# Patient Record
Sex: FEMALE | Race: WHITE | NOT HISPANIC OR LATINO | Employment: OTHER | URBAN - METROPOLITAN AREA
[De-identification: names, ages, dates, MRNs, and addresses within clinical notes are randomized per-mention and may not be internally consistent; named-entity substitution may affect disease eponyms.]

---

## 2023-10-13 ENCOUNTER — HOSPITAL ENCOUNTER (EMERGENCY)
Facility: HOSPITAL | Age: 63
Discharge: HOME | End: 2023-10-13
Attending: EMERGENCY MEDICINE
Payer: MEDICARE

## 2023-10-13 ENCOUNTER — APPOINTMENT (OUTPATIENT)
Dept: GENERAL RADIOLOGY | Facility: HOSPITAL | Age: 63
End: 2023-10-13
Payer: MEDICARE

## 2023-10-13 VITALS
TEMPERATURE: 100.3 F | WEIGHT: 127 LBS | RESPIRATION RATE: 16 BRPM | DIASTOLIC BLOOD PRESSURE: 57 MMHG | SYSTOLIC BLOOD PRESSURE: 118 MMHG | OXYGEN SATURATION: 97 % | HEART RATE: 86 BPM | HEIGHT: 66 IN | BODY MASS INDEX: 20.41 KG/M2

## 2023-10-13 DIAGNOSIS — R06.00 ACUTE DYSPNEA: ICD-10-CM

## 2023-10-13 DIAGNOSIS — J18.9 PNEUMONIA OF BOTH LUNGS DUE TO INFECTIOUS ORGANISM, UNSPECIFIED PART OF LUNG: Primary | ICD-10-CM

## 2023-10-13 LAB
ALBUMIN SERPL-MCNC: 4.2 G/DL (ref 3.5–5.2)
ALBUMIN/GLOB SERPL: 1.9 G/DL
ALP SERPL-CCNC: 67 U/L (ref 39–117)
ALT SERPL W P-5'-P-CCNC: 11 U/L (ref 1–33)
ANION GAP SERPL CALCULATED.3IONS-SCNC: 10.4 MMOL/L (ref 5–15)
AST SERPL-CCNC: 19 U/L (ref 1–32)
B PARAPERT DNA SPEC QL NAA+PROBE: NOT DETECTED
B PERT DNA SPEC QL NAA+PROBE: NOT DETECTED
BASOPHILS # BLD AUTO: 0.04 10*3/MM3 (ref 0–0.2)
BASOPHILS NFR BLD AUTO: 0.4 % (ref 0–1.5)
BILIRUB SERPL-MCNC: 0.2 MG/DL (ref 0–1.2)
BUN SERPL-MCNC: 11 MG/DL (ref 8–23)
BUN/CREAT SERPL: 15.3 (ref 7–25)
C PNEUM DNA NPH QL NAA+NON-PROBE: NOT DETECTED
CALCIUM SPEC-SCNC: 8.9 MG/DL (ref 8.6–10.5)
CHLORIDE SERPL-SCNC: 100 MMOL/L (ref 98–107)
CO2 SERPL-SCNC: 24.6 MMOL/L (ref 22–29)
CREAT SERPL-MCNC: 0.72 MG/DL (ref 0.57–1)
DEPRECATED RDW RBC AUTO: 40.8 FL (ref 37–54)
EGFRCR SERPLBLD CKD-EPI 2021: 92.9 ML/MIN/1.73
EOSINOPHIL # BLD AUTO: 0.07 10*3/MM3 (ref 0–0.4)
EOSINOPHIL NFR BLD AUTO: 0.7 % (ref 0.3–6.2)
ERYTHROCYTE [DISTWIDTH] IN BLOOD BY AUTOMATED COUNT: 12.6 % (ref 12.3–15.4)
FLUAV SUBTYP SPEC NAA+PROBE: NOT DETECTED
FLUBV RNA ISLT QL NAA+PROBE: NOT DETECTED
GLOBULIN UR ELPH-MCNC: 2.2 GM/DL
GLUCOSE SERPL-MCNC: 205 MG/DL (ref 65–99)
HADV DNA SPEC NAA+PROBE: NOT DETECTED
HCOV 229E RNA SPEC QL NAA+PROBE: NOT DETECTED
HCOV HKU1 RNA SPEC QL NAA+PROBE: NOT DETECTED
HCOV NL63 RNA SPEC QL NAA+PROBE: NOT DETECTED
HCOV OC43 RNA SPEC QL NAA+PROBE: NOT DETECTED
HCT VFR BLD AUTO: 34.7 % (ref 34–46.6)
HGB BLD-MCNC: 11.8 G/DL (ref 12–15.9)
HMPV RNA NPH QL NAA+NON-PROBE: NOT DETECTED
HOLD SPECIMEN: NORMAL
HOLD SPECIMEN: NORMAL
HPIV1 RNA ISLT QL NAA+PROBE: NOT DETECTED
HPIV2 RNA SPEC QL NAA+PROBE: NOT DETECTED
HPIV3 RNA NPH QL NAA+PROBE: NOT DETECTED
HPIV4 P GENE NPH QL NAA+PROBE: NOT DETECTED
IMM GRANULOCYTES # BLD AUTO: 0.02 10*3/MM3 (ref 0–0.05)
IMM GRANULOCYTES NFR BLD AUTO: 0.2 % (ref 0–0.5)
LYMPHOCYTES # BLD AUTO: 0.64 10*3/MM3 (ref 0.7–3.1)
LYMPHOCYTES NFR BLD AUTO: 6 % (ref 19.6–45.3)
M PNEUMO IGG SER IA-ACNC: NOT DETECTED
MCH RBC QN AUTO: 30.8 PG (ref 26.6–33)
MCHC RBC AUTO-ENTMCNC: 34 G/DL (ref 31.5–35.7)
MCV RBC AUTO: 90.6 FL (ref 79–97)
MONOCYTES # BLD AUTO: 0.8 10*3/MM3 (ref 0.1–0.9)
MONOCYTES NFR BLD AUTO: 7.5 % (ref 5–12)
NEUTROPHILS NFR BLD AUTO: 85.2 % (ref 42.7–76)
NEUTROPHILS NFR BLD AUTO: 9.1 10*3/MM3 (ref 1.7–7)
NRBC BLD AUTO-RTO: 0 /100 WBC (ref 0–0.2)
NT-PROBNP SERPL-MCNC: 400 PG/ML (ref 0–900)
PLATELET # BLD AUTO: 200 10*3/MM3 (ref 140–450)
PMV BLD AUTO: 10.4 FL (ref 6–12)
POTASSIUM SERPL-SCNC: 3.8 MMOL/L (ref 3.5–5.2)
PROCALCITONIN SERPL-MCNC: <0.02 NG/ML (ref 0–0.25)
PROT SERPL-MCNC: 6.4 G/DL (ref 6–8.5)
RBC # BLD AUTO: 3.83 10*6/MM3 (ref 3.77–5.28)
RHINOVIRUS RNA SPEC NAA+PROBE: NOT DETECTED
RSV RNA NPH QL NAA+NON-PROBE: NOT DETECTED
SARS-COV-2 RNA NPH QL NAA+NON-PROBE: NOT DETECTED
SODIUM SERPL-SCNC: 135 MMOL/L (ref 136–145)
TROPONIN T SERPL HS-MCNC: 9 NG/L
WBC NRBC COR # BLD: 10.67 10*3/MM3 (ref 3.4–10.8)
WHOLE BLOOD HOLD COAG: NORMAL
WHOLE BLOOD HOLD SPECIMEN: NORMAL

## 2023-10-13 PROCEDURE — 83880 ASSAY OF NATRIURETIC PEPTIDE: CPT

## 2023-10-13 PROCEDURE — 71045 X-RAY EXAM CHEST 1 VIEW: CPT

## 2023-10-13 PROCEDURE — 93005 ELECTROCARDIOGRAM TRACING: CPT | Performed by: EMERGENCY MEDICINE

## 2023-10-13 PROCEDURE — 0202U NFCT DS 22 TRGT SARS-COV-2: CPT | Performed by: EMERGENCY MEDICINE

## 2023-10-13 PROCEDURE — 84145 PROCALCITONIN (PCT): CPT | Performed by: EMERGENCY MEDICINE

## 2023-10-13 PROCEDURE — 94664 DEMO&/EVAL PT USE INHALER: CPT

## 2023-10-13 PROCEDURE — 94640 AIRWAY INHALATION TREATMENT: CPT

## 2023-10-13 PROCEDURE — 94761 N-INVAS EAR/PLS OXIMETRY MLT: CPT

## 2023-10-13 PROCEDURE — 99284 EMERGENCY DEPT VISIT MOD MDM: CPT

## 2023-10-13 PROCEDURE — 94799 UNLISTED PULMONARY SVC/PX: CPT

## 2023-10-13 PROCEDURE — 85025 COMPLETE CBC W/AUTO DIFF WBC: CPT | Performed by: EMERGENCY MEDICINE

## 2023-10-13 PROCEDURE — 80053 COMPREHEN METABOLIC PANEL: CPT

## 2023-10-13 PROCEDURE — 84484 ASSAY OF TROPONIN QUANT: CPT

## 2023-10-13 RX ORDER — CEFDINIR 300 MG/1
300 CAPSULE ORAL 2 TIMES DAILY
Qty: 13 CAPSULE | Refills: 0 | Status: SHIPPED | OUTPATIENT
Start: 2023-10-13

## 2023-10-13 RX ORDER — IPRATROPIUM BROMIDE AND ALBUTEROL SULFATE 2.5; .5 MG/3ML; MG/3ML
3 SOLUTION RESPIRATORY (INHALATION) ONCE
Status: COMPLETED | OUTPATIENT
Start: 2023-10-13 | End: 2023-10-13

## 2023-10-13 RX ORDER — CEFDINIR 300 MG/1
300 CAPSULE ORAL ONCE
Status: COMPLETED | OUTPATIENT
Start: 2023-10-13 | End: 2023-10-13

## 2023-10-13 RX ORDER — SODIUM CHLORIDE 0.9 % (FLUSH) 0.9 %
10 SYRINGE (ML) INJECTION AS NEEDED
Status: DISCONTINUED | OUTPATIENT
Start: 2023-10-13 | End: 2023-10-13 | Stop reason: HOSPADM

## 2023-10-13 RX ADMIN — CEFDINIR 300 MG: 300 CAPSULE ORAL at 18:14

## 2023-10-13 RX ADMIN — IPRATROPIUM BROMIDE AND ALBUTEROL SULFATE 3 ML: .5; 2.5 SOLUTION RESPIRATORY (INHALATION) at 16:43

## 2023-10-13 NOTE — DISCHARGE INSTRUCTIONS
Take antibiotics as prescribed.  Continue take your Zithromax, inhaler, and cough medication.  Return to the emergency department for worsening/persistent symptoms, chest pain, fever, or other concern.  Follow-up with your primary care provider if symptoms are not improving in the next several days.

## 2023-10-13 NOTE — ED NOTES
Patient ambulated in hallway, HR remained in 90s, O2 sats remained >94%. Patient reports improvement of cough after breathing treatment. MD Dennison aware.

## 2023-10-13 NOTE — ED NOTES
Patient to ER via car from [facility] for cough and URI x 3 weeks    Patient reports she was treated 3 weeks ago and is getting worse    Patient had xray and neb treatment at [facility]

## 2023-10-13 NOTE — ED PROVIDER NOTES
EMERGENCY DEPARTMENT ENCOUNTER    Room Number:  10/10  PCP: Provider, No Known  Historian: Patient, friend      HPI:  Chief Complaint: Cough, shortness of breath  A complete HPI/ROS/PMH/PSH/SH/FH are unobtainable due to: Nothing  Context: Jonelle Stearns is a 63 y.o. female who presents to the ED from the [facility] by EMS c/o cough and shortness of breath for the past 3 weeks.  She was initially seen at the [facility] about 3 weeks ago and given a prescription for doxycycline.  Symptoms gradually improved but then worsened about 2 days ago.  Cough is productive of a scant amount of thick yellow sputum.  She reports feeling short of breath when she has to cough and due to chest congestion.  Denies exertional dyspnea.  Denies fever, chills, sore throat, chest pain, abdominal pain, vomiting, diarrhea, leg pain, or leg swelling.  Denies history of heart or lung disease.  She does not smoke.  She was seen in [facility] earlier today.  Chest x-ray done there showed possible pneumonia.  She was given a nebulizer treatment.  She was given prescriptions for an inhaler, cough medication, and a Z-Jaya.  She was sent to the ED for further evaluation.            PAST MEDICAL HISTORY  Active Ambulatory Problems     Diagnosis Date Noted    No Active Ambulatory Problems     Resolved Ambulatory Problems     Diagnosis Date Noted    No Resolved Ambulatory Problems     No Additional Past Medical History         PAST SURGICAL HISTORY  History reviewed. No pertinent surgical history.      FAMILY HISTORY  History reviewed. No pertinent family history.      SOCIAL HISTORY  Social History     Socioeconomic History    Marital status: Single         ALLERGIES  Penicillins    REVIEW OF SYSTEMS  All systems have been reviewed and are negative except for those listed in the HPI      PHYSICAL EXAM  ED Triage Vitals   Temp Heart Rate Resp BP SpO2   10/13/23 1426 10/13/23 1426 10/13/23 1426 10/13/23 1430 10/13/23 1426    100.3 øF (37.9 øC) 103 20 130/65 98 %      Temp src Heart Rate Source Patient Position BP Location FiO2 (%)   -- -- 10/13/23 1430 10/13/23 1430 --     Sitting Left arm        Physical Exam      GENERAL: Awake, alert, oriented x3.  Well-developed, well-nourished and nontoxic-appearing female.  Appears mildly dyspneic and anxious  HENT: NCAT, nares patent, oropharynx is benign  EYES: no scleral icterus  CV: regular rhythm, normal rate  RESPIRATORY: Mildly tachypneic, lungs are clear bilaterally  ABDOMEN: soft, nontender, nondistended  MUSCULOSKELETAL: Extremities are nontender with full range of motion.  No calf tenderness.  No pedal edema  NEURO: Speech is normal.  No facial droop.  Follows commands  PSYCH: Anxious, cooperative  SKIN: warm, dry    Vital signs and nursing notes reviewed.          LAB RESULTS  Recent Results (from the past 24 hour(s))   Comprehensive Metabolic Panel    Collection Time: 10/13/23  2:37 PM    Specimen: Blood   Result Value Ref Range    Glucose 205 (H) 65 - 99 mg/dL    BUN 11 8 - 23 mg/dL    Creatinine 0.72 0.57 - 1.00 mg/dL    Sodium 135 (L) 136 - 145 mmol/L    Potassium 3.8 3.5 - 5.2 mmol/L    Chloride 100 98 - 107 mmol/L    CO2 24.6 22.0 - 29.0 mmol/L    Calcium 8.9 8.6 - 10.5 mg/dL    Total Protein 6.4 6.0 - 8.5 g/dL    Albumin 4.2 3.5 - 5.2 g/dL    ALT (SGPT) 11 1 - 33 U/L    AST (SGOT) 19 1 - 32 U/L    Alkaline Phosphatase 67 39 - 117 U/L    Total Bilirubin 0.2 0.0 - 1.2 mg/dL    Globulin 2.2 gm/dL    A/G Ratio 1.9 g/dL    BUN/Creatinine Ratio 15.3 7.0 - 25.0    Anion Gap 10.4 5.0 - 15.0 mmol/L    eGFR 92.9 >60.0 mL/min/1.73   BNP    Collection Time: 10/13/23  2:37 PM    Specimen: Blood   Result Value Ref Range    proBNP 400.0 0.0 - 900.0 pg/mL   Single High Sensitivity Troponin T    Collection Time: 10/13/23  2:37 PM    Specimen: Blood   Result Value Ref Range    HS Troponin T 9 <10 ng/L   Green Top (Gel)    Collection Time: 10/13/23  2:37 PM   Result Value Ref Range    Extra Tube  Hold for add-ons.    Lavender Top    Collection Time: 10/13/23  2:37 PM   Result Value Ref Range    Extra Tube hold for add-on    Gold Top - SST    Collection Time: 10/13/23  2:37 PM   Result Value Ref Range    Extra Tube Hold for add-ons.    Light Blue Top    Collection Time: 10/13/23  2:37 PM   Result Value Ref Range    Extra Tube Hold for add-ons.    CBC Auto Differential    Collection Time: 10/13/23  2:37 PM    Specimen: Blood   Result Value Ref Range    WBC 10.67 3.40 - 10.80 10*3/mm3    RBC 3.83 3.77 - 5.28 10*6/mm3    Hemoglobin 11.8 (L) 12.0 - 15.9 g/dL    Hematocrit 34.7 34.0 - 46.6 %    MCV 90.6 79.0 - 97.0 fL    MCH 30.8 26.6 - 33.0 pg    MCHC 34.0 31.5 - 35.7 g/dL    RDW 12.6 12.3 - 15.4 %    RDW-SD 40.8 37.0 - 54.0 fl    MPV 10.4 6.0 - 12.0 fL    Platelets 200 140 - 450 10*3/mm3    Neutrophil % 85.2 (H) 42.7 - 76.0 %    Lymphocyte % 6.0 (L) 19.6 - 45.3 %    Monocyte % 7.5 5.0 - 12.0 %    Eosinophil % 0.7 0.3 - 6.2 %    Basophil % 0.4 0.0 - 1.5 %    Immature Grans % 0.2 0.0 - 0.5 %    Neutrophils, Absolute 9.10 (H) 1.70 - 7.00 10*3/mm3    Lymphocytes, Absolute 0.64 (L) 0.70 - 3.10 10*3/mm3    Monocytes, Absolute 0.80 0.10 - 0.90 10*3/mm3    Eosinophils, Absolute 0.07 0.00 - 0.40 10*3/mm3    Basophils, Absolute 0.04 0.00 - 0.20 10*3/mm3    Immature Grans, Absolute 0.02 0.00 - 0.05 10*3/mm3    nRBC 0.0 0.0 - 0.2 /100 WBC   Procalcitonin    Collection Time: 10/13/23  2:37 PM    Specimen: Blood   Result Value Ref Range    Procalcitonin <0.02 0.00 - 0.25 ng/mL   Respiratory Panel PCR w/COVID-19(SARS-CoV-2) [facility]/VIVIAN In-House, NP Swab in UTM/VTM, 3-4 HR TAT - Swab, Nasopharynx    Collection Time: 10/13/23  4:14 PM    Specimen: Nasopharynx; Swab   Result Value Ref Range    ADENOVIRUS, PCR Not Detected Not Detected    Coronavirus 229E Not Detected Not Detected    Coronavirus HKU1 Not Detected Not Detected    Coronavirus NL63 Not Detected Not Detected    Coronavirus OC43 Not Detected Not  Detected    COVID19 Not Detected Not Detected - Ref. Range    Human Metapneumovirus Not Detected Not Detected    Human Rhinovirus/Enterovirus Not Detected Not Detected    Influenza A PCR Not Detected Not Detected    Influenza B PCR Not Detected Not Detected    Parainfluenza Virus 1 Not Detected Not Detected    Parainfluenza Virus 2 Not Detected Not Detected    Parainfluenza Virus 3 Not Detected Not Detected    Parainfluenza Virus 4 Not Detected Not Detected    RSV, PCR Not Detected Not Detected    Bordetella pertussis pcr Not Detected Not Detected    Bordetella parapertussis PCR Not Detected Not Detected    Chlamydophila pneumoniae PCR Not Detected Not Detected    Mycoplasma pneumo by PCR Not Detected Not Detected   ECG 12 Lead ED Triage Standing Order; SOA    Collection Time: 10/13/23  4:30 PM   Result Value Ref Range    QT Interval 366 ms    QTC Interval 420 ms       Ordered the above labs and reviewed the results.        RADIOLOGY  XR Chest 1 View    Result Date: 10/13/2023  XR CHEST 1 VW-  HISTORY: 63-year-old female with shortness of breath.  FINDINGS: There are no priors for comparison. There is a small right pleural effusion and there is increased density and markings at the right costophrenic angle possibly secondary to pneumonia. There is also a small ill-defined airspace opacity at the right upper lobe and at the lingula. There is no left effusion or CHF. Follow-up with PA and lateral views of the chest is recommended.      XR Chest 2Vw    Result Date: 10/13/2023  REVIEWING YOUR TEST RESULTS IN [facility] IS NOT A SUBSTITUTE FOR DISCUSSING THOSE RESULTS WITH YOUR HEALTH CARE PROVIDER. PLEASE CONTACT YOUR PROVIDER VIA [facility] TO DISCUSS ANY QUESTIONS OR CONCERNS YOU MAY HAVE REGARDING THESE TEST RESULTS.  RADIOLOGY REPORT FACILITY:  [facility]      AGE:63 Y          SEX:F PATIENT NAME/:  TAMAR WILBURN L    1960 UNIT NUMBER:  [id number] ACCOUNT  NUMBER:  [id number] ACCESSION NUMBER:  [id number] EXAMINATION: XR CHEST 2VW DATE: 10/13/2023 COMPARISON: None available HISTORY: Cough x3 weeks SOB FINDINGS/IMPRESSION: Cardiac silhouette is within normal limits. Mild calcific aorta. There is no pleural effusion or pneumothorax. There are reticulonodular infiltrates bilaterally at the mid and lower lung lung zones characteristic pneumonia/pneumonitis possible atypical pneumonia or bronchopneumonia. Mild bronchial wall thickening. Dictated by: Rajani Peña M.D. Images and Report reviewed and interpreted by: Rajani Peña M.D. <PS><Electronically signed by: Rajani Peña M.D.> 10/13/2023 1459 D: 10/13/2023 1456 T: 10/13/2023 1456     Ordered the above noted radiological studies. Reviewed by me in PACS.            PROCEDURES  Procedures              MEDICATIONS GIVEN IN ER  Medications   ipratropium-albuterol (DUO-NEB) nebulizer solution 3 mL (3 mL Nebulization Given 10/13/23 1643)   cefdinir (OMNICEF) capsule 300 mg (300 mg Oral Given 10/13/23 1814)                   MEDICAL DECISION MAKING, PROGRESS, and CONSULTS    All labs have been independently reviewed by me.  All radiology studies have been reviewed by me and I have also reviewed the radiology report.   EKG's independently viewed and interpreted by me.  Discussion below represents my analysis of pertinent findings related to patient's condition, differential diagnosis, treatment plan and final disposition.      Additional sources:  - Discussed/ obtained information from independent historians: Friend at bedside    - External (non-ED) record review: Patient was seen at [facility] earlier today complaining of shortness of breath and cough for the past 3 weeks.  O2 sats were 99% on room air.  She was given prescriptions for albuterol inhaler, Tessalon, and a Z-Jaya.  She was treated with doxycycline about 3 weeks ago.  Patient was sent to the ED for further evaluation.    - Chronic or social  conditions impacting care: None          Orders placed during this visit:  Orders Placed This Encounter   Procedures    Respiratory Panel PCR w/COVID-19(SARS-CoV-2) [facility]/DEVANG/MAIA/PAD/COR/MAD/VIVIAN In-House, NP Swab in UTM/VTM, 3-4 HR TAT - Swab, Nasopharynx    XR Chest 1 View    [place] Draw    Comprehensive Metabolic Panel    BNP    Single High Sensitivity Troponin T    CBC Auto Differential    Procalcitonin    Undress & Gown    Continuous Pulse Oximetry    Vital Signs    O2 Sat & HR During Ambulation - Report Results to MD    ECG 12 Lead ED Triage Standing Order; SOA    CBC & Differential    Green Top (Gel)    Lavender Top    Gold Top - SST    Light Blue Top         Additional orders considered but not ordered:  Admission was considered.  Patient's symptoms improved with nebulizer treatment.  She was not hypoxic.  Shared decision-making was discussed and she was comfortable with the plan for outpatient treatment        Differential diagnosis:    Differential diagnosis includes but is not limited to CHF, acute coronary syndrome, pulmonary embolism, pneumothorax, pneumonia, asthma/COPD, deconditioning, anemia, anxiety.         Independent interpretation of labs, radiology studies, and discussions with consultants:  ED Course as of 10/13/23 2227   Fri Oct 13, 2023   1551 Glucose(!): 205 [WH]   1551 Creatinine: 0.72 [WH]   1551 HS Troponin T: 9 [WH]   1551 proBNP: 400.0 [WH]   1552 WBC: 10.67 [WH]   1552 Hemoglobin(!): 11.8 [WH]   1552 Chest x-ray personally interpreted by me.  My personal interpretation is: Heart size is normal.  There is a patchy opacity in the right base.  No pneumothorax    Per the radiologist, there is a small right pleural effusion.  There is increased density in markings at the right costophrenic angle possibly secondary to pneumonia.  There is an ill-defined airspace opacity in the right upper lobe and lingula. [WH]   1552 Neutrophil Rel %(!): 85.2 [WH]   1606 O2 sat is currently 100% on room air  [WH]   1636 EKG personally interpreted by me at 1634.  My personal interpretation is:      EKG time: 1630  Rhythm/Rate: Sinus rhythm, rate 79  P waves and WA: Normal  QRS, axis: Normal axis, early transition  ST and T waves: Normal    Interpreted Contemporaneously by me, independently viewed  No prior available for comparison    [WH]   1639 Procalcitonin: <0.02 [WH]   1726 Respiratory panel is negative [WH]   1742 Patient's O2 sats remained between 94 and 97% on room air while ambulating [WH]   1752 Test results discussed with the patient.  She is resting and breathing comfortably.  She is no longer tachypneic.  Oxygen saturation is 98% on room air.  On reexam, lungs are clear.  Patient is allergic to penicillin.  She will be given a prescription for Omnicef.  She was given a prescription for a Z-Jaya at the [facility].  I advised her to take both antibiotics.  Return precautions were discussed. [WH]   1811 MDM: Patient presented to ED complaining of cough and shortness of breath for the past 3 weeks.  She was initially treated with doxycycline.  Chest x-ray showed few patchy areas of pneumonia.  Labs were unremarkable.  Symptoms improved with DuoNeb treatment.  Patient was not hypoxic on room air even with ambulation.  She was prescribed a Z-Jaya earlier today.  She will be started on Omnicef as well. [WH]      ED Course User Index  [WH] Marcos Dennison MD               DIAGNOSIS  Final diagnoses:   Pneumonia of both lungs due to infectious organism, unspecified part of lung   Acute dyspnea         DISPOSITION  DISCHARGE    Patient discharged in stable condition.    Reviewed implications of results, diagnosis, meds, responsibility to follow up, warning signs and symptoms of possible worsening, potential complications and reasons to return to ER, including worsening/persistent symptoms, fever, chest pain, trouble breathing, or other concern.    Patient/Family voiced understanding of above  instructions.    Discussed plan for discharge, as there is no emergent indication for admission. Patient referred to primary care provider for BP management due to today's BP. Pt/family is agreeable and understands need for follow up and repeat testing.  Pt is aware that discharge does not mean that nothing is wrong but it indicates no emergency is present that requires admission and they must continue care with follow-up as given below or physician of their choice.     FOLLOW-UP  Your primary care provider    Schedule an appointment as soon as possible for a visit   If symptoms persist         Medication List        New Prescriptions      cefdinir 300 MG capsule  Commonly known as: OMNICEF  Take 1 capsule by mouth 2 (Two) Times a Day.               Where to Get Your Medications        These medications were sent to [id number] - [place], [place] - [address] - [phone]  - [phone]   [address] [place] [address]      Phone: [phone]   cefdinir 300 MG capsule                   Latest Documented Vital Signs:  As of 22:27 EDT  BP- 118/57 HR- 86 Temp- 100.3 øF (37.9 øC) O2 sat- 97%              --    Please note that portions of this were completed with a voice recognition program.       Note Disclaimer: At [facility], we believe that sharing information builds trust and better relationships. You are receiving this note because you are receiving care at [facility] or recently visited. It is possible you will see health information before a provider has talked with you about it. This kind of information can be easy to misunderstand. To help you fully understand what it means for your health, we urge you to discuss this note with your provider.             Marcos Dennison MD  10/13/23 3451

## 2023-10-15 LAB
QT INTERVAL: 366 MS
QTC INTERVAL: 420 MS

## 2024-01-15 ENCOUNTER — PREP FOR SURGERY (OUTPATIENT)
Dept: OTHER | Facility: HOSPITAL | Age: 64
End: 2024-01-15
Payer: MEDICARE

## 2024-01-15 ENCOUNTER — TELEPHONE (OUTPATIENT)
Dept: GASTROENTEROLOGY | Facility: CLINIC | Age: 64
End: 2024-01-15
Payer: MEDICARE

## 2024-01-15 DIAGNOSIS — R19.5 POSITIVE COLORECTAL CANCER SCREENING USING COLOGUARD TEST: Primary | ICD-10-CM

## 2024-01-15 NOTE — TELEPHONE ENCOUNTER
POSITIVE COLOGUARD    NO PERSONAL HX OF POLYPS     FAMILY HX OF POLYPS     FAMILY HX OF COLON CA    NO ASA OR BLOOD THINNERS        LIST OF  MEDICATIONS   LEVOCETIRIZINE  DIHYDROCHLORIDE  FLONASE  ALBUTEROL              OA QUESTIONNAIRE SCANNED IN MEDIA

## 2024-01-18 ENCOUNTER — TELEPHONE (OUTPATIENT)
Dept: GASTROENTEROLOGY | Facility: CLINIC | Age: 64
End: 2024-01-18
Payer: MEDICARE

## 2024-01-18 PROBLEM — R19.5 POSITIVE COLORECTAL CANCER SCREENING USING COLOGUARD TEST: Status: ACTIVE | Noted: 2024-01-15

## 2024-01-18 NOTE — TELEPHONE ENCOUNTER
SPOKE WITH  for COLONOSCOPY 02/05/2024 arrive at  9AM [facility]  . mailed prep instructions to verified address on file....miralax OK FOR HUB TO READ

## 2024-02-02 RX ORDER — ALBUTEROL SULFATE 90 UG/1
2 AEROSOL, METERED RESPIRATORY (INHALATION) EVERY 4 HOURS PRN
COMMUNITY
Start: 2023-12-19

## 2024-02-02 RX ORDER — FLUTICASONE PROPIONATE 50 MCG
1 SPRAY, SUSPENSION (ML) NASAL DAILY
COMMUNITY

## 2024-02-02 RX ORDER — LEVOCETIRIZINE DIHYDROCHLORIDE 5 MG/1
5 TABLET, FILM COATED ORAL EVERY EVENING
COMMUNITY
Start: 2023-12-10

## 2024-02-05 ENCOUNTER — ANESTHESIA (OUTPATIENT)
Dept: GASTROENTEROLOGY | Facility: HOSPITAL | Age: 64
End: 2024-02-05
Payer: MEDICARE

## 2024-02-05 ENCOUNTER — ANESTHESIA EVENT (OUTPATIENT)
Dept: GASTROENTEROLOGY | Facility: HOSPITAL | Age: 64
End: 2024-02-05
Payer: MEDICARE

## 2024-02-05 ENCOUNTER — HOSPITAL ENCOUNTER (OUTPATIENT)
Facility: HOSPITAL | Age: 64
Setting detail: HOSPITAL OUTPATIENT SURGERY
Discharge: HOME | End: 2024-02-05
Attending: INTERNAL MEDICINE | Admitting: INTERNAL MEDICINE
Payer: MEDICARE

## 2024-02-05 VITALS
OXYGEN SATURATION: 99 % | BODY MASS INDEX: 18.32 KG/M2 | DIASTOLIC BLOOD PRESSURE: 82 MMHG | HEART RATE: 63 BPM | SYSTOLIC BLOOD PRESSURE: 121 MMHG | RESPIRATION RATE: 16 BRPM | WEIGHT: 114 LBS | HEIGHT: 66 IN

## 2024-02-05 DIAGNOSIS — R19.5 POSITIVE COLORECTAL CANCER SCREENING USING COLOGUARD TEST: ICD-10-CM

## 2024-02-05 PROCEDURE — 88305 TISSUE EXAM BY PATHOLOGIST: CPT | Performed by: INTERNAL MEDICINE

## 2024-02-05 PROCEDURE — S0260 H&P FOR SURGERY: HCPCS | Performed by: INTERNAL MEDICINE

## 2024-02-05 PROCEDURE — 25010000002 PROPOFOL 10 MG/ML EMULSION: Performed by: NURSE ANESTHETIST, CERTIFIED REGISTERED

## 2024-02-05 PROCEDURE — 25810000003 LACTATED RINGERS PER 1000 ML: Performed by: INTERNAL MEDICINE

## 2024-02-05 RX ORDER — PROPOFOL 10 MG/ML
VIAL (ML) INTRAVENOUS AS NEEDED
Status: DISCONTINUED | OUTPATIENT
Start: 2024-02-05 | End: 2024-02-05 | Stop reason: SURG

## 2024-02-05 RX ORDER — SODIUM CHLORIDE, SODIUM LACTATE, POTASSIUM CHLORIDE, CALCIUM CHLORIDE 600; 310; 30; 20 MG/100ML; MG/100ML; MG/100ML; MG/100ML
30 INJECTION, SOLUTION INTRAVENOUS CONTINUOUS PRN
Status: DISCONTINUED | OUTPATIENT
Start: 2024-02-05 | End: 2024-02-05 | Stop reason: HOSPADM

## 2024-02-05 RX ADMIN — PROPOFOL 30 MG: 10 INJECTION, EMULSION INTRAVENOUS at 10:12

## 2024-02-05 RX ADMIN — SODIUM CHLORIDE, POTASSIUM CHLORIDE, SODIUM LACTATE AND CALCIUM CHLORIDE 30 ML/HR: 600; 310; 30; 20 INJECTION, SOLUTION INTRAVENOUS at 09:25

## 2024-02-05 RX ADMIN — PROPOFOL 60 MG: 10 INJECTION, EMULSION INTRAVENOUS at 10:05

## 2024-02-05 RX ADMIN — PROPOFOL 25 MG: 10 INJECTION, EMULSION INTRAVENOUS at 10:07

## 2024-02-05 RX ADMIN — PROPOFOL 200 MCG/KG/MIN: 10 INJECTION, EMULSION INTRAVENOUS at 10:04

## 2024-02-05 NOTE — ANESTHESIA POSTPROCEDURE EVALUATION
Patient: Jonelle Stearns    Procedure Summary       Date: 02/05/24 Room / Location:  [facility] /  [facility]    Anesthesia Start: 0959 Anesthesia Stop: 1031    Procedure: COLONOSCOPY into cecum with hot snare polypectomy Diagnosis:       Positive colorectal cancer screening using Cologuard test      (Positive colorectal cancer screening using Cologuard test [R19.5])    Surgeons: Cary Moerl MD Provider: Agnieszka Acuña MD    Anesthesia Type: MAC ASA Status: 1            Anesthesia Type: MAC    Vitals  Vitals Value Taken Time   /82 02/05/24 1050   Temp     Pulse 64 02/05/24 1051   Resp 16 02/05/24 1049   SpO2 100 % 02/05/24 1051   Vitals shown include unfiled device data.        Post Anesthesia Care and Evaluation    Anesthetic complications: No anesthetic complications

## 2024-02-05 NOTE — ANESTHESIA PREPROCEDURE EVALUATION
Anesthesia Evaluation     no history of anesthetic complications:                Airway   Mallampati: I  TM distance: >3 FB  Neck ROM: full  Dental - normal exam     Pulmonary    (+) asthma (used inhaler today),  (-) shortness of breath, recent URI, not a smoker  Cardiovascular     (-) hypertension, dysrhythmias, angina, hyperlipidemia      Neuro/Psych  (-) dizziness/light headedness, syncope  GI/Hepatic/Renal/Endo    (-) liver disease, no renal disease, diabetes    Musculoskeletal     Abdominal    Substance History      OB/GYN          Other                    Anesthesia Plan    ASA 1     MAC     intravenous induction     Anesthetic plan, risks, benefits, and alternatives have been provided, discussed and informed consent has been obtained with: patient.    CODE STATUS:

## 2024-02-05 NOTE — H&P
"[facility]  Pre Procedure History & Physical    Chief Complaint:   Positive Cologuard    Subjective     HPI:   64yo here today for colonoscopy.  Pt reports denies FH CRC/polyps.  Patient denies GI symptoms currrently.      Past Medical History:   Past Medical History:   Diagnosis Date    Inflammation of lung     FOLLOWED BY [facility]  - INHALERS    Positive colorectal cancer screening using Cologuard test        Past Surgical History:  Past Surgical History:   Procedure Laterality Date    NO PAST SURGERIES         Family History:  Family History   Problem Relation Age of Onset    Malig Hyperthermia Neg Hx        Social History:   reports that she has never smoked. She has never used smokeless tobacco. She reports current alcohol use. She reports that she does not use drugs.    Medications:   Medications Prior to Admission   Medication Sig Dispense Refill Last Dose    albuterol sulfate  (90 Base) MCG/ACT inhaler Inhale 2 puffs Every 4 (Four) Hours As Needed for Shortness of Air or Wheezing.   Past Week    fluticasone (FLONASE) 50 MCG/ACT nasal spray 1 spray into the nostril(s) as directed by provider Daily.   Past Week    levocetirizine (XYZAL) 5 MG tablet Take 1 tablet by mouth Every Evening.   Past Week    mometasone-formoterol (DULERA 100) 100-5 MCG/ACT inhaler Inhale 2 puffs 2 (Two) Times a Day.   2/5/2024       Allergies:  Penicillins    ROS:    Pertinent items are noted in HPI     Objective     Blood pressure 124/60, pulse 65, resp. rate 16, height 167.6 cm (66\"), weight 51.7 kg (114 lb), SpO2 98%.    Physical Exam   Constitutional: Pt is oriented to person, place, and time and well-developed, well-nourished, and in no distress.   Mouth/Throat: Oropharynx is clear and moist.   Neck: Normal range of motion.   Cardiovascular: Normal rate, regular rhythm    Pulmonary/Chest: Effort normal    Abdominal: Soft. Nontender  Skin: Skin is warm and dry.   Psychiatric: Mood, memory, " affect and judgment normal.     Assessment & Plan     Diagnosis:  Positive Cologuard    Anticipated Surgical Procedure:  colonoscopy    The risks, benefits, and alternatives of this procedure have been discussed with the patient or the responsible party- the patient understands and agrees to proceed.

## 2024-02-06 LAB
LAB AP CASE REPORT: NORMAL
LAB AP CLINICAL INFORMATION: NORMAL
PATH REPORT.FINAL DX SPEC: NORMAL
PATH REPORT.GROSS SPEC: NORMAL

## 2024-02-13 ENCOUNTER — TELEPHONE (OUTPATIENT)
Dept: GASTROENTEROLOGY | Facility: CLINIC | Age: 64
End: 2024-02-13
Payer: MEDICARE

## 2024-03-04 ENCOUNTER — TRANSCRIBE ORDERS (OUTPATIENT)
Dept: ADMINISTRATIVE | Facility: HOSPITAL | Age: 64
End: 2024-03-04
Payer: MEDICARE

## 2024-03-04 DIAGNOSIS — R93.89 ABNORMAL CHEST X-RAY: Primary | ICD-10-CM

## 2024-03-27 ENCOUNTER — HOSPITAL ENCOUNTER (OUTPATIENT)
Facility: HOSPITAL | Age: 64
Discharge: HOME | End: 2024-03-27
Admitting: NURSE PRACTITIONER
Payer: MEDICARE

## 2024-03-27 DIAGNOSIS — R93.89 ABNORMAL CHEST X-RAY: ICD-10-CM

## 2024-03-27 PROCEDURE — 71250 CT THORAX DX C-: CPT

## 2024-04-02 ENCOUNTER — TRANSCRIBE ORDERS (OUTPATIENT)
Dept: ADMINISTRATIVE | Facility: HOSPITAL | Age: 64
End: 2024-04-02
Payer: MEDICARE

## 2024-04-02 DIAGNOSIS — K76.9 LIVER LESION: Primary | ICD-10-CM

## 2024-04-26 ENCOUNTER — HOSPITAL ENCOUNTER (OUTPATIENT)
Facility: HOSPITAL | Age: 64
Discharge: HOME | End: 2024-04-26
Payer: MEDICARE

## 2024-04-26 DIAGNOSIS — K76.9 LIVER LESION: ICD-10-CM

## 2024-04-26 PROCEDURE — 74177 CT ABD & PELVIS W/CONTRAST: CPT

## 2024-04-26 PROCEDURE — 25510000001 IOPAMIDOL 61 % SOLUTION: Performed by: NURSE PRACTITIONER

## 2024-04-26 PROCEDURE — 0 DIATRIZOATE MEGLUMINE & SODIUM PER 1 ML: Performed by: NURSE PRACTITIONER

## 2024-04-26 RX ORDER — MELALEUCA QUINQUENERVIA POLLEN 0.05 G/ML
INJECTION, SOLUTION SUBCUTANEOUS DAILY PRN
COMMUNITY

## 2024-04-26 RX ORDER — LORATADINE 10 MG/1
10 TABLET ORAL DAILY
COMMUNITY

## 2024-04-26 RX ADMIN — IOPAMIDOL 85 ML: 612 INJECTION, SOLUTION INTRAVENOUS at 10:09

## 2024-04-26 RX ADMIN — DIATRIZOATE MEGLUMINE AND DIATRIZOATE SODIUM 30 ML: 600; 100 SOLUTION ORAL; RECTAL at 08:40

## 2024-05-03 ENCOUNTER — TRANSCRIBE ORDERS (OUTPATIENT)
Dept: ADMINISTRATIVE | Facility: HOSPITAL | Age: 64
End: 2024-05-03
Payer: MEDICARE

## 2024-05-03 DIAGNOSIS — K76.9 LIVER LESION: Primary | ICD-10-CM

## 2024-05-30 ENCOUNTER — HOSPITAL ENCOUNTER (OUTPATIENT)
Facility: HOSPITAL | Age: 64
Discharge: HOME | End: 2024-05-30
Admitting: NURSE PRACTITIONER
Payer: MEDICARE

## 2024-05-30 DIAGNOSIS — K76.9 LIVER LESION: ICD-10-CM

## 2024-05-30 PROCEDURE — 0 DIATRIZOATE MEGLUMINE & SODIUM PER 1 ML: Performed by: NURSE PRACTITIONER

## 2024-05-30 PROCEDURE — 25510000001 IOPAMIDOL 61 % SOLUTION: Performed by: NURSE PRACTITIONER

## 2024-05-30 PROCEDURE — 74178 CT ABD&PLV WO CNTR FLWD CNTR: CPT

## 2024-05-30 RX ADMIN — IOPAMIDOL 100 ML: 612 INJECTION, SOLUTION INTRAVENOUS at 09:18

## 2024-05-30 RX ADMIN — DIATRIZOATE MEGLUMINE AND DIATRIZOATE SODIUM 30 ML: 600; 100 SOLUTION ORAL; RECTAL at 08:05

## 2024-06-04 NOTE — PROGRESS NOTES
Chief Complaint  liver lesions    Subjective          History of Present Illness    Jonelle Stearns is a  64 y.o. female presents for follow-up.  She is a patient of Dr. Morel and is new to me.    Recently underwent screening colonoscopy 2/5/2024 which showed a polyp in the transverse colon, nonbleeding internal hemorrhoids and otherwise normal exam.  Polyp showed adenomatous change.  Recommended repeat colonoscopy in 5 years for surveillance.    Patient presents to office today she recently had CT abdomen pelvis done which showed hypodense lesions in the liver.  She had follow-up CT four-phase liver protocol completed 5/30/2024.  This showed multiple benign hepatic hemangiomas and additional tiny subcentimeter low-attenuation lesions in the liver that statistically represent hepatic cyst or hemangiomas.  CT also noted patchy groundglass opacities and interstitial thickening in each lung base.  Discussed findings at length with patient.    Otherwise patient reports she has been doing well.  She says she developed a cough recently after having a bout of pneumonia and says she has been struggling with this.  She reports that her allergist said she may have silent reflux and was placed on famotidine and antireflux diet.  She says she has noticed a slight improvement but does still have the cough.  She is following up with pulmonology to see if her asthma may be playing a role.  She denies any abdominal pain, heartburn, difficulty swallowing.  She has lost some weight but attributes this to a healthy diet she has been doing for several months.  No nausea or vomiting.    CT abdomen pelvis 4/26/2024 showed at least 3 hypodense lesions in the liver that remain indeterminant.  Recommended follow-up CT of the abdomen with and without contrast for phase liver protocol.       CMP 5/10/2024 showed LFTs within normal limits.    Objective   Vital Signs:   /71   Pulse 80   Temp 95.7 °F (35.4 °C) (Temporal)   Ht 167.6 cm  "(66\")   Wt 50.5 kg (111 lb 4.8 oz)   BMI 17.96 kg/m²       Physical Exam  Constitutional:       General: She is not in acute distress.     Appearance: Normal appearance.   Eyes:      General: No scleral icterus.  Cardiovascular:      Rate and Rhythm: Normal rate.   Pulmonary:      Effort: Pulmonary effort is normal.   Abdominal:      General: Abdomen is flat. Bowel sounds are normal. There is no distension.      Tenderness: There is no abdominal tenderness. There is no guarding.   Skin:     Coloration: Skin is not jaundiced.   Neurological:      General: No focal deficit present.      Mental Status: She is alert and oriented to person, place, and time.   Psychiatric:         Mood and Affect: Mood normal.         Behavior: Behavior normal.          Result Review :   The following data was reviewed by: Mita Del Valle PA-C on 06/05/2024:  CMP          10/13/2023    14:37   CMP   Glucose 205    BUN 11    Creatinine 0.72    EGFR 92.9    Sodium 135    Potassium 3.8    Chloride 100    Calcium 8.9    Total Protein 6.4    Albumin 4.2    Globulin 2.2    Total Bilirubin 0.2    Alkaline Phosphatase 67    AST (SGOT) 19    ALT (SGPT) 11    Albumin/Globulin Ratio 1.9    BUN/Creatinine Ratio 15.3    Anion Gap 10.4      CBC          10/13/2023    14:37 12/8/2023    08:37 5/10/2024    09:38   CBC   WBC 10.67  6.42     6.50       RBC 3.83  4.10     4.20       Hemoglobin 11.8  11.8     12.3       Hematocrit 34.7  37.8     37.9       MCV 90.6  92.2     90.2       MCH 30.8  28.8     29.3       MCHC 34.0  31.2     32.5       RDW 12.6  14.6     14.9       Platelets 200  322     210          Details          This result is from an external source.                     Assessment:   Diagnoses and all orders for this visit:    1. Abnormal CT of liver (Primary)    2. Cough, unspecified type          Plan:   -Discussed findings of CT at length with patient and answered questions the best my ability.  -She is not having any symptoms of " reflux.  Cough seemed to start just after bout of pneumonia.  Discussed that she could continue famotidine for now but recommend she follow-up with pulmonology.  Advised if she develops any symptoms of heartburn, nausea, vomiting, difficulty swallowing we could proceed with EGD at that point.      Follow Up   No follow-ups on file.    Dragon dictation used throughout this note.         Mita Del Valle PA-C  [facility]  [address] [address]  Office: [phone]

## 2024-06-05 ENCOUNTER — OFFICE VISIT (OUTPATIENT)
Dept: GASTROENTEROLOGY | Facility: CLINIC | Age: 64
End: 2024-06-05
Payer: MEDICARE

## 2024-06-05 VITALS
SYSTOLIC BLOOD PRESSURE: 107 MMHG | TEMPERATURE: 95.7 F | WEIGHT: 111.3 LBS | HEIGHT: 66 IN | HEART RATE: 80 BPM | BODY MASS INDEX: 17.89 KG/M2 | DIASTOLIC BLOOD PRESSURE: 71 MMHG

## 2024-06-05 DIAGNOSIS — R05.9 COUGH, UNSPECIFIED TYPE: ICD-10-CM

## 2024-06-05 DIAGNOSIS — R93.2 ABNORMAL CT OF LIVER: Primary | ICD-10-CM

## 2024-06-05 RX ORDER — AMITRIPTYLINE HYDROCHLORIDE 10 MG/1
10 TABLET, FILM COATED ORAL DAILY
COMMUNITY
Start: 2024-05-10 | End: 2025-05-10

## 2024-06-05 RX ORDER — FAMOTIDINE 20 MG/1
20 TABLET, FILM COATED ORAL 2 TIMES DAILY
COMMUNITY

## 2024-06-05 NOTE — Clinical Note
CT abd pelvis liver protocol done 5/30 showed several hemangiomas and sub centimeter liver lesions. For the liver lesions would you recommend any repeat imaging?

## 2024-06-13 DIAGNOSIS — R93.2 ABNORMAL CT OF LIVER: Primary | ICD-10-CM

## 2024-07-15 ENCOUNTER — TRANSCRIBE ORDERS (OUTPATIENT)
Dept: ADMINISTRATIVE | Facility: HOSPITAL | Age: 64
End: 2024-07-15
Payer: MEDICARE

## 2024-07-15 DIAGNOSIS — R93.89 ABNORMAL CT OF THE CHEST: Primary | ICD-10-CM

## 2024-08-06 ENCOUNTER — TELEPHONE (OUTPATIENT)
Dept: GASTROENTEROLOGY | Facility: CLINIC | Age: 64
End: 2024-08-06
Payer: MEDICARE

## 2024-08-06 NOTE — TELEPHONE ENCOUNTER
Pt stated she was seen by her PCP on 8/5/24, visit note in epic. They have recommended she reach out for her reflux and a liver lesion see on CT.    Pt stated she had Pneumonia a year ago and ever since then she has kept a cough.  Pt stated she coughs when she talks.  She also coughs while eating.  She stated her throat feels sore.  Her PCP thinks she may have silent reflux.  She resumed her famotidine 20mg BID, which she stated she only took for a month initially and stopped after her symptoms Improved.    Pt has changed her diet to bland food along with foods low in PH.    Please advise on next steps.

## 2024-08-06 NOTE — TELEPHONE ENCOUNTER
Caller: Jonelle Stearns    Relationship to patient: Self    Best call back number: [phone]    Patient is needing: PT CALLED AND SAID SHE HAS BEEN HAVING A LOT OF DISCOMFORT IN HER THROAT AND COUGHING AFTER EATING AND SHE'S THINKING MAYBE SHE NEEDS TO GET HER ESOPHAGUS LOOKED AT. PLEASE ADVISE.

## 2024-08-09 DIAGNOSIS — R05.3 CHRONIC COUGH: Primary | ICD-10-CM

## 2024-08-09 NOTE — TELEPHONE ENCOUNTER
Cough is an uncommon symptom of GERD.   Given that it started after her pneumonia, this is likely a residual bronchospastic cough.  We can do an esophagram to see if she has any sig reflux that may be exacerbating the cough if she would like to have testing

## 2024-08-09 NOTE — TELEPHONE ENCOUNTER
Patient came into office to see about her call from yesterday. I advised Dr Morel hadn't gotten back with the nurses and she would hear from us when she gets back with her.

## 2024-08-09 NOTE — TELEPHONE ENCOUNTER
Called pt and advised of Dr Morel's note. Verb understanding. Pt would like to have esophagram.  Message sent to Dr Morel for order.

## 2024-09-04 ENCOUNTER — HOSPITAL ENCOUNTER (OUTPATIENT)
Dept: GENERAL RADIOLOGY | Facility: HOSPITAL | Age: 64
Discharge: HOME | End: 2024-09-04
Admitting: INTERNAL MEDICINE
Payer: MEDICARE

## 2024-09-04 DIAGNOSIS — R05.3 CHRONIC COUGH: ICD-10-CM

## 2024-09-04 PROCEDURE — A9270 NON-COVERED ITEM OR SERVICE: HCPCS | Performed by: INTERNAL MEDICINE

## 2024-09-04 PROCEDURE — 63710000001 BARIUM SULFATE 700 MG TABLET: Performed by: INTERNAL MEDICINE

## 2024-09-04 PROCEDURE — 63710000001 BARIUM SULFATE 98 % RECONSTITUTED SUSPENSION: Performed by: INTERNAL MEDICINE

## 2024-09-04 PROCEDURE — 74220 X-RAY XM ESOPHAGUS 1CNTRST: CPT

## 2024-09-04 PROCEDURE — 63710000001 BARIUM SULFATE 96 % RECONSTITUTED SUSPENSION: Performed by: INTERNAL MEDICINE

## 2024-09-04 PROCEDURE — 63710000001 SOD BICARB-CITRIC ACID-SIMETHICONE 2.21-1.53-0.04 G PACK: Performed by: INTERNAL MEDICINE

## 2024-09-04 PROCEDURE — 74221 X-RAY XM ESOPHAGUS 2CNTRST: CPT

## 2024-09-04 RX ADMIN — BARIUM SULFATE 135 ML: 980 POWDER, FOR SUSPENSION ORAL at 10:14

## 2024-09-04 RX ADMIN — BARIUM SULFATE 700 MG: 700 TABLET ORAL at 10:14

## 2024-09-04 RX ADMIN — BARIUM SULFATE 183 ML: 960 POWDER, FOR SUSPENSION ORAL at 10:14

## 2024-09-04 RX ADMIN — ANTACID/ANTIFLATULENT 1 PACKET: 380; 550; 10; 10 GRANULE, EFFERVESCENT ORAL at 10:14

## 2024-09-12 ENCOUNTER — HOSPITAL ENCOUNTER (OUTPATIENT)
Facility: HOSPITAL | Age: 64
Discharge: HOME | End: 2024-09-12
Admitting: NURSE PRACTITIONER
Payer: MEDICARE

## 2024-09-12 DIAGNOSIS — R93.89 ABNORMAL CT OF THE CHEST: ICD-10-CM

## 2024-09-12 PROCEDURE — 71250 CT THORAX DX C-: CPT

## 2024-09-17 ENCOUNTER — TELEPHONE (OUTPATIENT)
Dept: GASTROENTEROLOGY | Facility: CLINIC | Age: 64
End: 2024-09-17

## 2024-09-17 NOTE — TELEPHONE ENCOUNTER
Hub staff attempted to follow warm transfer process and was unsuccessful     Caller: Jonelle Stearns    Relationship to patient: Self    Best call back number: [phone]     Patient is needing: PT IS CALLING FOR TEST RESULTS THAT WERE DONE 09/04/2024. PLEASE CALL AND ADVISE.

## 2024-09-17 NOTE — TELEPHONE ENCOUNTER
Called pt and advised of Dr Clemente note. Verb understanding.     Pt reports that she is waiting to get a bronchoscopy scheduled and she will call us back once she has this scheduled .  She does not want to over lap the bronch with the egd.  Advised will update Dr Morel.

## 2024-09-17 NOTE — TELEPHONE ENCOUNTER
----- Message from Cary Morel sent at 9/16/2024  8:55 PM EDT -----  Esophagram shows some suggestion of esophageal relux related inflammation and possibly an erosion in the lower esophagus.  Tiny hiatal hernia and small volume reflux seen.  Would consider direct visualization with egd for further evaluation.  Please let me know if she wishes to proceed or has questions. While abnormalities are seen, this still may not explain her cough

## 2024-11-04 ENCOUNTER — PREP FOR SURGERY (OUTPATIENT)
Dept: OTHER | Facility: HOSPITAL | Age: 64
End: 2024-11-04
Payer: MEDICARE

## 2024-11-04 ENCOUNTER — TELEPHONE (OUTPATIENT)
Dept: GASTROENTEROLOGY | Facility: CLINIC | Age: 64
End: 2024-11-04
Payer: MEDICARE

## 2024-11-04 DIAGNOSIS — K21.9 GASTROESOPHAGEAL REFLUX DISEASE, UNSPECIFIED WHETHER ESOPHAGITIS PRESENT: Primary | ICD-10-CM

## 2024-11-04 DIAGNOSIS — R93.3 ABNORMAL ESOPHAGRAM: ICD-10-CM

## 2024-11-04 NOTE — TELEPHONE ENCOUNTER
Called pt and pt reports that she has had her bronchoscopy done and she is ready to proceed with egd. Advised will send message to Dr Morel.

## 2024-11-04 NOTE — TELEPHONE ENCOUNTER
Provider: DR RJ KNUTSON     Caller: TAMAR WILBURN     Relationship to Patient: SELF    Phone Number: [phone]     Reason for Call: PT STATED THAT IT WAS SUGGESTED BY DR KNUTSON THAT AFTER THE ESOPHOGRAM THAT PT HAS AN EGD PLEASE ADVISE AND CALL PT BACK

## 2024-11-05 ENCOUNTER — TELEPHONE (OUTPATIENT)
Dept: GASTROENTEROLOGY | Facility: CLINIC | Age: 64
End: 2024-11-05
Payer: MEDICARE

## 2024-11-05 PROBLEM — R93.3 ABNORMAL ESOPHAGRAM: Status: ACTIVE | Noted: 2024-11-04

## 2024-11-05 PROBLEM — K21.9 GASTROESOPHAGEAL REFLUX DISEASE: Status: ACTIVE | Noted: 2024-11-04

## 2024-11-05 NOTE — TELEPHONE ENCOUNTER
[facility]  for EGD on 02/03/2025  arrive at 130PM  . Mailed Prep instructions to Mailing address on-file. OK FOR HUB TO READ

## 2024-11-11 ENCOUNTER — HOSPITAL ENCOUNTER (OUTPATIENT)
Facility: HOSPITAL | Age: 64
Discharge: HOME | End: 2024-11-11
Payer: MEDICARE

## 2024-11-11 DIAGNOSIS — R93.2 ABNORMAL CT OF LIVER: ICD-10-CM

## 2024-11-11 PROCEDURE — 74177 CT ABD & PELVIS W/CONTRAST: CPT

## 2024-11-11 PROCEDURE — 0 DIATRIZOATE MEGLUMINE & SODIUM PER 1 ML

## 2024-11-11 PROCEDURE — 25510000001 IOPAMIDOL 61 % SOLUTION

## 2024-11-11 RX ORDER — IOPAMIDOL 612 MG/ML
100 INJECTION, SOLUTION INTRAVASCULAR
Status: COMPLETED | OUTPATIENT
Start: 2024-11-11 | End: 2024-11-11

## 2024-11-11 RX ORDER — MOMETASONE FUROATE AND FORMOTEROL FUMARATE DIHYDRATE 200; 5 UG/1; UG/1
2 AEROSOL RESPIRATORY (INHALATION)
COMMUNITY
Start: 2024-10-24

## 2024-11-11 RX ORDER — CETIRIZINE HYDROCHLORIDE 10 MG/1
10 TABLET ORAL DAILY
COMMUNITY

## 2024-11-11 RX ORDER — DIATRIZOATE MEGLUMINE AND DIATRIZOATE SODIUM 660; 100 MG/ML; MG/ML
30 SOLUTION ORAL; RECTAL
Status: COMPLETED | OUTPATIENT
Start: 2024-11-11 | End: 2024-11-11

## 2024-11-11 RX ADMIN — IOPAMIDOL 85 ML: 612 INJECTION, SOLUTION INTRAVENOUS at 08:39

## 2024-11-11 RX ADMIN — DIATRIZOATE MEGLUMINE AND DIATRIZOATE SODIUM 30 ML: 600; 100 SOLUTION ORAL; RECTAL at 07:35

## 2024-11-19 ENCOUNTER — TELEPHONE (OUTPATIENT)
Dept: GASTROENTEROLOGY | Facility: CLINIC | Age: 64
End: 2024-11-19
Payer: MEDICARE

## 2024-11-19 DIAGNOSIS — R93.2 ABNORMAL CT OF LIVER: Primary | ICD-10-CM

## 2024-11-19 NOTE — TELEPHONE ENCOUNTER
----- Message from Mitadenis Del Valle sent at 11/19/2024 12:56 PM EST -----  Please let patient know that her CT abdomen pelvis showed hepatic hemangiomata-this is a collection of blood vessels which is not concerning.  There was a spot on the liver that was too small to accurately characterize similar to her CT scan 6 months ago.  I would like to get 1 more CT scan in the next 6 months to 1 year to make sure it does not change again. Please let me know if she is agreeable and I can order it.

## 2024-11-19 NOTE — TELEPHONE ENCOUNTER
Called pt and advised of Mita PAYNE's note. Verb understanding.     Pt is agreeable to have repeat ct scan. Update sent to Mita PAYNE.

## 2025-01-30 RX ORDER — SODIUM CHLORIDE FOR INHALATION 3 %
VIAL, NEBULIZER (ML) INHALATION
COMMUNITY
Start: 2024-11-16

## 2025-02-03 ENCOUNTER — ANESTHESIA (OUTPATIENT)
Dept: GASTROENTEROLOGY | Facility: HOSPITAL | Age: 65
End: 2025-02-03
Payer: MEDICARE

## 2025-02-03 ENCOUNTER — HOSPITAL ENCOUNTER (OUTPATIENT)
Facility: HOSPITAL | Age: 65
Setting detail: HOSPITAL OUTPATIENT SURGERY
Discharge: HOME | End: 2025-02-03
Attending: INTERNAL MEDICINE | Admitting: INTERNAL MEDICINE
Payer: MEDICARE

## 2025-02-03 ENCOUNTER — ANESTHESIA EVENT (OUTPATIENT)
Dept: GASTROENTEROLOGY | Facility: HOSPITAL | Age: 65
End: 2025-02-03
Payer: MEDICARE

## 2025-02-03 VITALS
WEIGHT: 117.4 LBS | OXYGEN SATURATION: 97 % | HEIGHT: 66 IN | DIASTOLIC BLOOD PRESSURE: 76 MMHG | HEART RATE: 75 BPM | SYSTOLIC BLOOD PRESSURE: 126 MMHG | RESPIRATION RATE: 17 BRPM | BODY MASS INDEX: 18.87 KG/M2

## 2025-02-03 DIAGNOSIS — R93.3 ABNORMAL ESOPHAGRAM: ICD-10-CM

## 2025-02-03 DIAGNOSIS — K21.9 GASTROESOPHAGEAL REFLUX DISEASE, UNSPECIFIED WHETHER ESOPHAGITIS PRESENT: ICD-10-CM

## 2025-02-03 PROCEDURE — 43239 EGD BIOPSY SINGLE/MULTIPLE: CPT | Performed by: INTERNAL MEDICINE

## 2025-02-03 PROCEDURE — 25010000002 PROPOFOL 1000 MG/100ML EMULSION: Performed by: NURSE ANESTHETIST, CERTIFIED REGISTERED

## 2025-02-03 PROCEDURE — 25010000002 PROPOFOL 200 MG/20ML EMULSION: Performed by: NURSE ANESTHETIST, CERTIFIED REGISTERED

## 2025-02-03 PROCEDURE — 25010000002 GLYCOPYRROLATE 0.2 MG/ML SOLUTION: Performed by: NURSE ANESTHETIST, CERTIFIED REGISTERED

## 2025-02-03 PROCEDURE — 88305 TISSUE EXAM BY PATHOLOGIST: CPT | Performed by: INTERNAL MEDICINE

## 2025-02-03 PROCEDURE — 25010000002 LIDOCAINE 2% SOLUTION: Performed by: NURSE ANESTHETIST, CERTIFIED REGISTERED

## 2025-02-03 PROCEDURE — S0260 H&P FOR SURGERY: HCPCS | Performed by: INTERNAL MEDICINE

## 2025-02-03 PROCEDURE — 25810000003 LACTATED RINGERS PER 1000 ML: Performed by: INTERNAL MEDICINE

## 2025-02-03 RX ORDER — SODIUM CHLORIDE, SODIUM LACTATE, POTASSIUM CHLORIDE, CALCIUM CHLORIDE 600; 310; 30; 20 MG/100ML; MG/100ML; MG/100ML; MG/100ML
30 INJECTION, SOLUTION INTRAVENOUS CONTINUOUS PRN
Status: DISCONTINUED | OUTPATIENT
Start: 2025-02-03 | End: 2025-02-03 | Stop reason: HOSPADM

## 2025-02-03 RX ORDER — GLYCOPYRROLATE 0.2 MG/ML
INJECTION INTRAMUSCULAR; INTRAVENOUS AS NEEDED
Status: DISCONTINUED | OUTPATIENT
Start: 2025-02-03 | End: 2025-02-03 | Stop reason: SURG

## 2025-02-03 RX ORDER — PROPOFOL 10 MG/ML
INJECTION, EMULSION INTRAVENOUS AS NEEDED
Status: DISCONTINUED | OUTPATIENT
Start: 2025-02-03 | End: 2025-02-03 | Stop reason: SURG

## 2025-02-03 RX ORDER — LIDOCAINE HYDROCHLORIDE 20 MG/ML
INJECTION, SOLUTION INFILTRATION; PERINEURAL AS NEEDED
Status: DISCONTINUED | OUTPATIENT
Start: 2025-02-03 | End: 2025-02-03 | Stop reason: SURG

## 2025-02-03 RX ORDER — PROPOFOL 10 MG/ML
INJECTION, EMULSION INTRAVENOUS CONTINUOUS PRN
Status: DISCONTINUED | OUTPATIENT
Start: 2025-02-03 | End: 2025-02-03 | Stop reason: SURG

## 2025-02-03 RX ADMIN — GLYCOPYRROLATE 0.2 MG: 0.2 INJECTION INTRAMUSCULAR; INTRAVENOUS at 15:10

## 2025-02-03 RX ADMIN — SODIUM CHLORIDE, POTASSIUM CHLORIDE, SODIUM LACTATE AND CALCIUM CHLORIDE 30 ML/HR: 600; 310; 30; 20 INJECTION, SOLUTION INTRAVENOUS at 14:21

## 2025-02-03 RX ADMIN — PROPOFOL 120 MCG/KG/MIN: 10 INJECTION, EMULSION INTRAVENOUS at 15:11

## 2025-02-03 RX ADMIN — LIDOCAINE HYDROCHLORIDE 60 MG: 20 INJECTION, SOLUTION INFILTRATION; PERINEURAL at 15:10

## 2025-02-03 RX ADMIN — PROPOFOL INJECTABLE EMULSION 50 MG: 10 INJECTION, EMULSION INTRAVENOUS at 15:10

## 2025-02-03 NOTE — ANESTHESIA PREPROCEDURE EVALUATION
Anesthesia Evaluation     Patient summary reviewed and Nursing notes reviewed                Airway   Mallampati: II  Dental      Pulmonary - negative pulmonary ROS   Cardiovascular - negative cardio ROS    ECG reviewed  Rhythm: regular  Rate: normal        Neuro/Psych- negative ROS  GI/Hepatic/Renal/Endo - negative ROS   (+) GERD    Musculoskeletal (-) negative ROS    Abdominal    Substance History - negative use  (+) alcohol use     OB/GYN negative ob/gyn ROS         Other                    Anesthesia Plan    ASA 2     MAC     intravenous induction     Anesthetic plan, risks, benefits, and alternatives have been provided, discussed and informed consent has been obtained with: patient.    CODE STATUS:

## 2025-02-03 NOTE — DISCHARGE INSTRUCTIONS
For the next 24 hours patient needs to be with a responsible adult.    For THE REST OF TODAY DO NOT drive, operate machinery, appliances, drink alcohol, make important decisions or sign legal documents.    Start with a light or bland diet if you are feeling sick to your stomach otherwise advance to regular diet as tolerated.    Follow recommendations on procedure report if provided by your doctor.    Call Dr Morel for problems [phone]    Problems may include but not limited to: large amounts of bleeding, trouble breathing, repeated vomiting, severe unrelieved pain, fever or chills.      If biopsies or polyps were taken, MD will call you with the results in about 7 days. If you don't hear from the MD in 2 weeks, call the number above.

## 2025-02-03 NOTE — ANESTHESIA POSTPROCEDURE EVALUATION
"Patient: Jonelle Stearns    Procedure Summary       Date: 02/03/25 Room / Location:  [facility]    Anesthesia Start: 1506 Anesthesia Stop: 1519    Procedure: ESOPHAGOGASTRODUODENOSCOPY WITH BIOPSIES (Esophagus) Diagnosis:       Gastroesophageal reflux disease, unspecified whether esophagitis present      Abnormal esophagram      (Gastroesophageal reflux disease, unspecified whether esophagitis present [K21.9])      (Abnormal esophagram [R93.3])    Surgeons: Cary Morel MD Provider: Juan Silva MD    Anesthesia Type: MAC ASA Status: 2            Anesthesia Type: MAC    Vitals  Vitals Value Taken Time   /76 02/03/25 1539   Temp     Pulse 72 02/03/25 1544   Resp 17 02/03/25 1538   SpO2 100 % 02/03/25 1544   Vitals shown include unfiled device data.        Post Anesthesia Care and Evaluation    Pain management: adequate    Airway patency: patent  Anesthetic complications: No anesthetic complications    Cardiovascular status: acceptable  Respiratory status: acceptable  Hydration status: acceptable    Comments: /76 (BP Location: Left arm, Patient Position: Sitting)   Pulse 75   Resp 17   Ht 166.4 cm (65.5\")   Wt 53.3 kg (117 lb 6.4 oz)   SpO2 97%   BMI 19.24 kg/m²       "

## 2025-02-03 NOTE — H&P
[facility]  Pre Procedure History & Physical    Chief Complaint:   Acid reflux, abnormal esophagram    Subjective     HPI:   64-year-old female here today for EGD.  She had an esophagram in September 2024 that showed esophagitis demonstrated by increased mucosal granularity and thickened layer folds as well as a possible superficial erosion within the lower thoracic esophagus.  She was also noted to have GERD as well as a tiny sliding hiatal hernia.  The barium tablet showed no stasis on his path of the stomach.    Past Medical History:   Past Medical History:   Diagnosis Date    Allergy to pain medication     in childhood-unknown name    Arm fracture     Bronchiectasis     Bronchitis 01/2025    Cough     Esophageal erosions     Inflammation of lung     FOLLOWED BY [facility]  - INHALERS    Positive colorectal cancer screening using Cologuard test        Past Surgical History:  Past Surgical History:   Procedure Laterality Date    BRONCHOSCOPY      COLONOSCOPY N/A 02/05/2024    Procedure: COLONOSCOPY into cecum with hot snare polypectomy;  Surgeon: Cary Morel MD;  Location: [facility];  Service: Gastroenterology;  Laterality: N/A;  pre: + cologuard, family hx of colon cancer/polyps   post: polyp, hemorrhoids    NO PAST SURGERIES         Family History:  Family History   Problem Relation Age of Onset    Malig Hyperthermia Neg Hx        Social History:   reports that she has quit smoking. Her smoking use included cigarettes. She has never used smokeless tobacco. She reports current alcohol use. She reports that she does not use drugs.    Medications:   Medications Prior to Admission   Medication Sig Dispense Refill Last Dose/Taking    albuterol sulfate  (90 Base) MCG/ACT inhaler Inhale 2 puffs Every 4 (Four) Hours As Needed for Shortness of Air or Wheezing.   Past Week    amitriptyline (ELAVIL) 10 MG tablet Take 1 tablet by mouth Daily.   2/2/2025    Cholecalciferol  "50 MCG (2000 UT) capsule Take 1 capsule by mouth Daily.   2/2/2025    famotidine (PEPCID) 20 MG tablet Take 1 tablet by mouth 2 (Two) Times a Day.   2/2/2025    fluticasone (FLONASE) 50 MCG/ACT nasal spray Administer 1 spray into the nostril(s) as directed by provider Daily.   2/2/2025    sodium chloride 3 % nebulizer solution INHALE 4 MLS VIA NEBULIZATION TWICE DAILY   2/2/2025    Umeclidinium-Vilanterol (ANORO ELLIPTA IN) Inhale Take As Directed.   2/3/2025       Allergies:  Penicillins    ROS:    Pertinent items are noted in HPI     Objective     Blood pressure 133/80, pulse 71, resp. rate 21, height 166.4 cm (65.5\"), weight 53.3 kg (117 lb 6.4 oz), SpO2 100%.    Physical Exam   Constitutional: Pt is oriented to person, place, and time and well-developed, well-nourished, and in no distress.   Mouth/Throat: Oropharynx is clear and moist.   Neck: Normal range of motion.   Cardiovascular: Normal rate, regular rhythm    Pulmonary/Chest: Effort normal    Abdominal: Soft. Nontender  Skin: Skin is warm and dry.   Psychiatric: Mood, memory, affect and judgment normal.     Assessment & Plan     Diagnosis:  Acid reflux, abnormal esophagram    Anticipated Surgical Procedure:  Esophagogastroduodenoscopy    The risks, benefits, and alternatives of this procedure have been discussed with the patient or the responsible party- the patient understands and agrees to proceed.                                                              "

## 2025-02-05 LAB
CYTO UR: NORMAL
LAB AP CASE REPORT: NORMAL
PATH REPORT.FINAL DX SPEC: NORMAL
PATH REPORT.GROSS SPEC: NORMAL

## 2025-02-10 NOTE — PROGRESS NOTES
No significant inflammation seen on esophageal biopsies.  Continue pepcid as needed.  Recommend diet and lifestyle modification to reduce acid reflux symptoms such as eating small meals, reducing fat caffeine and alcohol in the diet, avoid eating close to bedtime, eliminate excess weight if applicable.   F/u as needed

## 2025-02-11 ENCOUNTER — TELEPHONE (OUTPATIENT)
Dept: GASTROENTEROLOGY | Facility: CLINIC | Age: 65
End: 2025-02-11
Payer: MEDICARE

## 2025-02-11 NOTE — TELEPHONE ENCOUNTER
----- Message from Cary Morel sent at 2/10/2025  2:49 PM EST -----  No significant inflammation seen on esophageal biopsies.  Continue pepcid as needed.  Recommend diet and lifestyle modification to reduce acid reflux symptoms such as eating small meals, reducing fat caffeine and alcohol in the diet, avoid eating close to bedtime, eliminate excess weight if applicable.   F/u as needed

## 2025-06-10 ENCOUNTER — HOSPITAL ENCOUNTER (OUTPATIENT)
Facility: HOSPITAL | Age: 65
Discharge: HOME | End: 2025-06-10
Payer: MEDICARE

## 2025-06-10 DIAGNOSIS — R93.2 ABNORMAL CT OF LIVER: ICD-10-CM

## 2025-06-10 PROCEDURE — 25510000002 DIATRIZOATE MEGLUMINE & SODIUM PER 1 ML

## 2025-06-10 PROCEDURE — 74178 CT ABD&PLV WO CNTR FLWD CNTR: CPT

## 2025-06-10 PROCEDURE — 25510000001 IOPAMIDOL 61 % SOLUTION

## 2025-06-10 RX ORDER — IOPAMIDOL 612 MG/ML
100 INJECTION, SOLUTION INTRAVASCULAR
Status: COMPLETED | OUTPATIENT
Start: 2025-06-10 | End: 2025-06-10

## 2025-06-10 RX ORDER — DIATRIZOATE MEGLUMINE AND DIATRIZOATE SODIUM 660; 100 MG/ML; MG/ML
30 SOLUTION ORAL; RECTAL
Status: COMPLETED | OUTPATIENT
Start: 2025-06-10 | End: 2025-06-10

## 2025-06-10 RX ADMIN — IOPAMIDOL 85 ML: 612 INJECTION, SOLUTION INTRAVENOUS at 13:55

## 2025-06-10 RX ADMIN — DIATRIZOATE MEGLUMINE AND DIATRIZOATE SODIUM 30 ML: 600; 100 SOLUTION ORAL; RECTAL at 13:55

## (undated) DEVICE — SNAR POLYP SENSATION STDOVL 27 240 BX40

## (undated) DEVICE — LN SMPL CO2 SHTRM SD STREAM W/M LUER

## (undated) DEVICE — FRCP BX RADJAW4 NDL 2.8 240CM LG OG BX80

## (undated) DEVICE — KT ORCA ORCAPOD DISP STRL

## (undated) DEVICE — FRCP BX RADJAW4 NDL 2.8 240CM LG OG BX40

## (undated) DEVICE — ADAPT CLN BIOGUARD AIR/H2O DISP

## (undated) DEVICE — TBG SXN CONN/F UNIV 1/4IN 10FT LF STRL

## (undated) DEVICE — BLCK/BITE BLOX W/DENTL/RIM W/STRAP 54F

## (undated) DEVICE — MSK PROC CURAPLEX O2 2/ADAPT 7FT

## (undated) DEVICE — SENSR O2 OXIMAX FNGR A/ 18IN NONSTR

## (undated) DEVICE — TRAP POLYP ETRAP 2PK

## (undated) DEVICE — TUBING, SUCTION, 1/4" X 10', STRAIGHT: Brand: MEDLINE

## (undated) DEVICE — CANN O2 ETCO2 FITS ALL CONN CO2 SMPL A/ 7IN DISP LF